# Patient Record
Sex: MALE | Race: WHITE | NOT HISPANIC OR LATINO | URBAN - METROPOLITAN AREA
[De-identification: names, ages, dates, MRNs, and addresses within clinical notes are randomized per-mention and may not be internally consistent; named-entity substitution may affect disease eponyms.]

---

## 2017-01-19 ENCOUNTER — OFFICE (OUTPATIENT)
Dept: URBAN - METROPOLITAN AREA CLINIC 11 | Facility: CLINIC | Age: 67
End: 2017-01-19

## 2017-01-19 VITALS
RESPIRATION RATE: 16 BRPM | SYSTOLIC BLOOD PRESSURE: 141 MMHG | WEIGHT: 241 LBS | HEIGHT: 71 IN | HEART RATE: 90 BPM | DIASTOLIC BLOOD PRESSURE: 101 MMHG

## 2017-01-19 DIAGNOSIS — K59.03 DRUG INDUCED CONSTIPATION: ICD-10-CM

## 2017-01-19 PROCEDURE — 99213 OFFICE O/P EST LOW 20 MIN: CPT | Performed by: INTERNAL MEDICINE

## 2017-01-19 RX ORDER — NALOXEGOL OXALATE 25 MG/1
TABLET, FILM COATED ORAL
Qty: 30 | Refills: 6 | Status: COMPLETED
Start: 2016-04-26 | End: 2017-01-19

## 2017-01-19 RX ORDER — LUBIPROSTONE 8 UG/1
CAPSULE, GELATIN COATED ORAL
Qty: 60 | Refills: 0 | Status: COMPLETED
Start: 2017-01-19 | End: 2017-03-30

## 2017-01-19 RX ORDER — POLYETHYLENE GLYCOL 3350 17 G/17G
POWDER, FOR SOLUTION ORAL
Qty: 1 | Refills: 11 | Status: COMPLETED
Start: 2016-07-01 | End: 2017-01-19

## 2017-01-19 NOTE — SERVICEHPINOTES
He has continued to ahve issues with his stooling.  He has had hard stools that can be difficult to pass followed by 3-4 days without a movement.  He has been on the Miralax once day and has been "afraid to use more" due to possible diarrhea.   He is also on the Movantik daily.  He has continued to need his short and longterm Morphine.  He has not been on Amitiza.

## 2017-01-19 NOTE — SERVICENOTES
We discussed the constipation and the medication changes.  If this dosing of Amitiza is not helpful, I would increase it after about 2 weeks.  He is to call and check in.  He can use the MIralax as needed as well.

## 2017-03-30 ENCOUNTER — OFFICE (OUTPATIENT)
Dept: URBAN - METROPOLITAN AREA CLINIC 11 | Facility: CLINIC | Age: 67
End: 2017-03-30

## 2017-03-30 VITALS
HEIGHT: 71 IN | HEART RATE: 84 BPM | WEIGHT: 245 LBS | RESPIRATION RATE: 16 BRPM | SYSTOLIC BLOOD PRESSURE: 122 MMHG | DIASTOLIC BLOOD PRESSURE: 75 MMHG

## 2017-03-30 DIAGNOSIS — K59.00 CONSTIPATION, UNSPECIFIED: ICD-10-CM

## 2017-03-30 PROCEDURE — 99213 OFFICE O/P EST LOW 20 MIN: CPT | Performed by: INTERNAL MEDICINE

## 2017-03-30 RX ORDER — LUBIPROSTONE 8 UG/1
CAPSULE, GELATIN COATED ORAL
Qty: 60 | Refills: 0 | Status: COMPLETED
Start: 2017-01-19 | End: 2017-03-30

## 2017-03-30 RX ORDER — PLECANATIDE 3 MG/1
3 TABLET ORAL
Qty: 90 | Refills: 1 | Status: COMPLETED
Start: 2017-03-30 | End: 2017-10-27

## 2017-03-30 NOTE — SERVICENOTES
We discussed his medication changes, diet issues, expectations, and risks of pelvic floor dysfunction.

## 2017-03-30 NOTE — SERVICEHPINOTES
The constipation has continued to be an issue.  He may have a stool every several days.  he yamile have 1-2 days of 3-4 stools and then stop again.  He has pain just before he starts passing the stools. There is "a lot" of gas prior to passing the stools.  The stools are without bleeding and the stools are a normal consistency (sometimes the first stool is harder).  He has not had other associated GI issues. He has been on the Amitiza without improvement.  He has been on Miralax but with daily dosing he had diarrhea. He has also tried Movantik and Linzess. He has been on his pain meds consistently.

## 2017-06-30 ENCOUNTER — OFFICE (OUTPATIENT)
Dept: URBAN - METROPOLITAN AREA CLINIC 11 | Facility: CLINIC | Age: 67
End: 2017-06-30

## 2017-06-30 VITALS
SYSTOLIC BLOOD PRESSURE: 132 MMHG | HEIGHT: 71 IN | DIASTOLIC BLOOD PRESSURE: 91 MMHG | WEIGHT: 225 LBS | HEART RATE: 67 BPM

## 2017-06-30 DIAGNOSIS — K59.00 CONSTIPATION, UNSPECIFIED: ICD-10-CM

## 2017-06-30 DIAGNOSIS — Z86.010 PERSONAL HISTORY OF COLONIC POLYPS: ICD-10-CM

## 2017-06-30 PROCEDURE — 99213 OFFICE O/P EST LOW 20 MIN: CPT | Performed by: INTERNAL MEDICINE

## 2017-06-30 RX ORDER — POLYETHYLENE GLYCOL 3350 17 G/17G
578 POWDER, FOR SOLUTION ORAL
Qty: 1 | Refills: 3 | Status: COMPLETED
Start: 2017-06-30 | End: 2018-08-31

## 2017-06-30 RX ORDER — POLYETHYLENE GLYCOL 3350, SODIUM SULFATE, SODIUM CHLORIDE, POTASSIUM CHLORIDE, ASCORBIC ACID, SODIUM ASCORBATE 7.5-2.691G
KIT ORAL
Qty: 1 | Refills: 0 | Status: COMPLETED
Start: 2017-06-30 | End: 2018-01-09

## 2017-06-30 NOTE — SERVICENOTES
We discussed his constipation, multiple medications, history of polyps, and the addition of Miralax to the Trulance.  With the history of polyps, he is due for his colonoscopy and we discussed the need to ensure that he did not have new polyps, tumors or strictures with his prior surgeries.

## 2017-06-30 NOTE — SERVICEHPINOTES
He has been having difficulties with his headaches and has been using Sumatriptan for them.He has had continued issues with constipation.  He has been having stools for a couple of a days that are mostly normal and then may skip a week or 10 days without another stool.  The first stool afterward is solid and then he may have a diarrhea stool.  He has noted some leakage with the constipation.   He has been taking Trulance without improvement. He has not been using OTC meds. He has been on Miralax, Linzess, Movantik, and Amitiza. He has a history of tubular adenomas and his last colonoscopy was in 2012.   FONT style="BACKGROUND-COLOR: #ffffcc" visited="true"BR/FONT  H

## 2017-07-24 ENCOUNTER — AMBULATORY SURGICAL CENTER (OUTPATIENT)
Dept: URBAN - METROPOLITAN AREA SURGERY 3 | Facility: SURGERY | Age: 67
End: 2017-07-24

## 2017-07-24 ENCOUNTER — OFFICE (OUTPATIENT)
Dept: URBAN - METROPOLITAN AREA CLINIC 11 | Facility: CLINIC | Age: 67
End: 2017-07-24

## 2017-07-24 VITALS
HEART RATE: 87 BPM | OXYGEN SATURATION: 96 % | SYSTOLIC BLOOD PRESSURE: 136 MMHG | DIASTOLIC BLOOD PRESSURE: 84 MMHG | SYSTOLIC BLOOD PRESSURE: 132 MMHG | SYSTOLIC BLOOD PRESSURE: 130 MMHG | HEART RATE: 86 BPM | HEART RATE: 91 BPM | DIASTOLIC BLOOD PRESSURE: 88 MMHG | RESPIRATION RATE: 18 BRPM | HEART RATE: 88 BPM | DIASTOLIC BLOOD PRESSURE: 84 MMHG | OXYGEN SATURATION: 96 % | RESPIRATION RATE: 19 BRPM | DIASTOLIC BLOOD PRESSURE: 83 MMHG | TEMPERATURE: 97.4 F | TEMPERATURE: 97.4 F | DIASTOLIC BLOOD PRESSURE: 81 MMHG | DIASTOLIC BLOOD PRESSURE: 94 MMHG | TEMPERATURE: 97 F | SYSTOLIC BLOOD PRESSURE: 126 MMHG | DIASTOLIC BLOOD PRESSURE: 81 MMHG | RESPIRATION RATE: 18 BRPM | DIASTOLIC BLOOD PRESSURE: 83 MMHG | DIASTOLIC BLOOD PRESSURE: 88 MMHG | HEART RATE: 87 BPM | HEART RATE: 91 BPM | HEART RATE: 92 BPM | HEART RATE: 86 BPM | SYSTOLIC BLOOD PRESSURE: 151 MMHG | HEART RATE: 88 BPM | HEART RATE: 92 BPM | SYSTOLIC BLOOD PRESSURE: 136 MMHG | OXYGEN SATURATION: 95 % | TEMPERATURE: 97 F | SYSTOLIC BLOOD PRESSURE: 132 MMHG | SYSTOLIC BLOOD PRESSURE: 151 MMHG | OXYGEN SATURATION: 95 % | RESPIRATION RATE: 14 BRPM | SYSTOLIC BLOOD PRESSURE: 130 MMHG | HEIGHT: 71 IN | RESPIRATION RATE: 14 BRPM | RESPIRATION RATE: 19 BRPM | HEIGHT: 71 IN | DIASTOLIC BLOOD PRESSURE: 94 MMHG | SYSTOLIC BLOOD PRESSURE: 126 MMHG

## 2017-07-24 DIAGNOSIS — Z86.010 PERSONAL HISTORY OF COLONIC POLYPS: ICD-10-CM

## 2017-07-24 DIAGNOSIS — D12.3 BENIGN NEOPLASM OF TRANSVERSE COLON: ICD-10-CM

## 2017-07-24 PROCEDURE — 45380 COLONOSCOPY AND BIOPSY: CPT | Mod: 33 | Performed by: INTERNAL MEDICINE

## 2017-07-24 PROCEDURE — 88305 TISSUE EXAM BY PATHOLOGIST: CPT | Performed by: INTERNAL MEDICINE

## 2017-10-27 ENCOUNTER — OFFICE (OUTPATIENT)
Dept: URBAN - METROPOLITAN AREA CLINIC 11 | Facility: CLINIC | Age: 67
End: 2017-10-27

## 2017-10-27 VITALS
HEIGHT: 71 IN | HEART RATE: 74 BPM | WEIGHT: 228 LBS | DIASTOLIC BLOOD PRESSURE: 88 MMHG | SYSTOLIC BLOOD PRESSURE: 132 MMHG

## 2017-10-27 DIAGNOSIS — T40.2X5D ADVERSE EFFECT OF OTHER OPIOIDS, SUBSEQUENT ENCOUNTER: ICD-10-CM

## 2017-10-27 PROCEDURE — 99213 OFFICE O/P EST LOW 20 MIN: CPT | Performed by: INTERNAL MEDICINE

## 2017-10-27 NOTE — SERVICEHPINOTES
He has been taking Miralax and Relistor (started yesterday) by this pain management provider.  He had been on Miralax and Trulance without improvement.  He has a stool about once a week.  At times the stools can be hard and large but sometimes soft and looser.  He has been on Movantik this year and had been on Linzess, Miralax, Trulance, and Amitiza.  He was on Relistor previously but only had a couple of injection. He had a small polyp noted on colonoscopy.  He has been trying to walk the dog a couple of times a day and attempts at being active around the house.  He ahs continued on the narcotics for pain control. He has not been having issues with the reflux on meds.  He has had some intermittent "choking" with saliva which seems to be related to is underlying tremors.

## 2017-10-27 NOTE — SERVICENOTES
He has had his meds changed from Trulance/Miralax to Miralax/relistor yesterday.  I would see how this works prior changing the Relistor.  This is a complex issue with the dysautonomia and also drug related difficulties

## 2018-01-09 ENCOUNTER — OFFICE (OUTPATIENT)
Dept: URBAN - METROPOLITAN AREA CLINIC 11 | Facility: CLINIC | Age: 68
End: 2018-01-09
Payer: MEDICARE

## 2018-01-09 VITALS
SYSTOLIC BLOOD PRESSURE: 147 MMHG | WEIGHT: 216 LBS | DIASTOLIC BLOOD PRESSURE: 98 MMHG | HEIGHT: 71 IN | HEART RATE: 73 BPM

## 2018-01-09 DIAGNOSIS — T40.2X5D ADVERSE EFFECT OF OTHER OPIOIDS, SUBSEQUENT ENCOUNTER: ICD-10-CM

## 2018-01-09 PROCEDURE — 99213 OFFICE O/P EST LOW 20 MIN: CPT | Performed by: INTERNAL MEDICINE

## 2018-01-09 NOTE — SERVICENOTES
With his severe constipation and noting the several regmines/medications that have not worked, I would like to retry Amitzia but at 24mcg BID with the daily Relistor.  If this is not helpful, I would like a Sitz marker study.  We did discuss a potential colectomy if this does not improve.  He was given samples of the Amitiza and a script can be sent later if this works.

## 2018-01-09 NOTE — SERVICEHPINOTES
He presents for f/u of his constipation.  Despite taking his meds, he had not had any stool for about 3 weeks until after taking mag citrate yesterday.  He has been consistent with his Miralax and Relistor. He has been on several other meds including using an enema.  He has been on Movantik without improvement.  He has not had blood in stool and has not had acute abdominal pain.  He has not had nuasea iwh the constiaptoin. .  His narcotics have not changes.  He has also had issues with urinary tract infections and difficulty urinating.He had been on mestinon about 2 weeks ago. His last colonoscopy was in July.  He has a heartcath scheduled for Friday.

## 2018-03-01 ENCOUNTER — OFFICE (OUTPATIENT)
Dept: URBAN - METROPOLITAN AREA CLINIC 11 | Facility: CLINIC | Age: 68
End: 2018-03-01
Payer: MEDICARE

## 2018-03-01 VITALS
HEIGHT: 71 IN | HEART RATE: 81 BPM | DIASTOLIC BLOOD PRESSURE: 91 MMHG | SYSTOLIC BLOOD PRESSURE: 139 MMHG | WEIGHT: 215 LBS

## 2018-03-01 DIAGNOSIS — K59.00 CONSTIPATION, UNSPECIFIED: ICD-10-CM

## 2018-03-01 PROCEDURE — 99213 OFFICE O/P EST LOW 20 MIN: CPT | Performed by: INTERNAL MEDICINE

## 2018-03-01 RX ORDER — SENNA AND DOCUSATE SODIUM 50; 8.6 MG/1; MG/1
TABLET, FILM COATED ORAL
Qty: 120 | Refills: 11 | Status: COMPLETED
Start: 2018-03-01 | End: 2018-11-08

## 2018-03-01 NOTE — SERVICEHPINOTES
He has had some improvement in his stool and was having a stool about once a week (instead of weeks).  He has been on the Senna and Amitiza.  Recently he was out of the Senna and went a week and a half.  He has not had acute abdominal paion or toehr symptoms. He has not had issues with the meds.  He has been on Relistor once daily, Amitiza 24mcg BID, and Senna with prn Miralax. He had a his f/u cath and no significant plaques were noted.  BR

## 2018-03-01 NOTE — SERVICENOTES
We discused the extremes of his constipation, the combination of his meds, and the increase in the senna.  We discussed colonic atony and a possible future colecotomy or megacolon.

## 2018-05-01 ENCOUNTER — OFFICE (OUTPATIENT)
Dept: URBAN - METROPOLITAN AREA CLINIC 11 | Facility: CLINIC | Age: 68
End: 2018-05-01
Payer: COMMERCIAL

## 2018-05-01 VITALS
HEIGHT: 71 IN | WEIGHT: 211 LBS | HEART RATE: 81 BPM | SYSTOLIC BLOOD PRESSURE: 113 MMHG | DIASTOLIC BLOOD PRESSURE: 66 MMHG

## 2018-05-01 DIAGNOSIS — K21.9 GASTRO-ESOPHAGEAL REFLUX DISEASE WITHOUT ESOPHAGITIS: ICD-10-CM

## 2018-05-01 DIAGNOSIS — T50.905A ADVERSE EFFECT OF UNSPECIFIED DRUGS, MEDICAMENTS AND BIOLOGI: ICD-10-CM

## 2018-05-01 PROCEDURE — 99213 OFFICE O/P EST LOW 20 MIN: CPT | Performed by: INTERNAL MEDICINE

## 2018-05-01 RX ORDER — METHYLNALTREXONE BROMIDE 150 MG/1
TABLET ORAL
Qty: 90 | Refills: 11 | Status: COMPLETED
End: 2019-05-23

## 2018-05-01 RX ORDER — PANTOPRAZOLE SODIUM 40 MG/1
40 TABLET, DELAYED RELEASE ORAL
Qty: 90 | Refills: 3 | Status: ACTIVE

## 2018-05-01 NOTE — SERVICENOTES
He has had an improvement but has required a fairly involved regimine.  We discussed a possible change from Relistor to symphoric but with current improvement, he wanted to wait on the addition/changes to his meds.

## 2018-05-01 NOTE — SERVICEHPINOTES
He stated that he has been havign stools about twice a week which is an improvement from about once a month.  He stated that the stools are soft and are requiring Relistor, Amitiza, Senna and prn Miralax to have the pattern.  He has not had had issues with bleeding or recurrent abdominal pain.  He may have some discomfort with gas prior to stools.  He has noted some continued feeling of inomplete clearing after a stool, but it is not consistent.  He stated that on the days he has stools that he may have 2-3 stools that day with a fairly large amount of stool.He has been on Protonix and has not had issues with his GERD while on it.  He has continued on the Morphine.

## 2018-08-31 ENCOUNTER — OFFICE (OUTPATIENT)
Dept: URBAN - METROPOLITAN AREA CLINIC 11 | Facility: CLINIC | Age: 68
End: 2018-08-31
Payer: COMMERCIAL

## 2018-08-31 VITALS
DIASTOLIC BLOOD PRESSURE: 83 MMHG | SYSTOLIC BLOOD PRESSURE: 125 MMHG | WEIGHT: 199 LBS | HEIGHT: 71 IN | HEART RATE: 78 BPM

## 2018-08-31 DIAGNOSIS — T50.905D ADVERSE EFFECT OF UNSPECIFIED DRUGS, MEDICAMENTS AND BIOLOGI: ICD-10-CM

## 2018-08-31 PROCEDURE — 99213 OFFICE O/P EST LOW 20 MIN: CPT | Performed by: INTERNAL MEDICINE

## 2018-08-31 RX ORDER — LINACLOTIDE 145 UG/1
145 CAPSULE, GELATIN COATED ORAL
Qty: 90 | Refills: 3 | Status: COMPLETED
Start: 2018-08-31 | End: 2018-10-14

## 2018-08-31 RX ORDER — NALDEMEDINE 0.2 MG/1
0.2 TABLET ORAL
Refills: 0 | Status: COMPLETED
End: 2018-08-31

## 2018-08-31 NOTE — SERVICEHPINOTES
He presents for f/u of his constipation.  This week has been doing beter.  He has been on Relistor and has constipation without a stool for 1-2 weeks and then will have stools for a few days. This week he has had stools daily.  He has been on several meds.  He stated that he thought he was taking Symproic currently but it not in his list and not in his historical list.  He is on relistor daily as well.   He has been on Miralax with his relistor in the past and had dairrhea with it.  He only takes it on rare occasion.  He is off of the senna as well.

## 2018-08-31 NOTE — SERVICENOTES
We again discussed his meds and I would stop the symproic and add Linzess.  We discussed the potential side effect issues with the meds.

## 2018-11-08 ENCOUNTER — OFFICE (OUTPATIENT)
Dept: URBAN - METROPOLITAN AREA CLINIC 11 | Facility: CLINIC | Age: 68
End: 2018-11-08
Payer: COMMERCIAL

## 2018-11-08 VITALS
WEIGHT: 200 LBS | HEIGHT: 71 IN | SYSTOLIC BLOOD PRESSURE: 118 MMHG | HEART RATE: 76 BPM | DIASTOLIC BLOOD PRESSURE: 80 MMHG

## 2018-11-08 DIAGNOSIS — T40.2X5D ADVERSE EFFECT OF OTHER OPIOIDS, SUBSEQUENT ENCOUNTER: ICD-10-CM

## 2018-11-08 PROCEDURE — 99213 OFFICE O/P EST LOW 20 MIN: CPT | Performed by: INTERNAL MEDICINE

## 2018-11-08 RX ORDER — NALDEMEDINE 0.2 MG/1
0.2 TABLET ORAL
Qty: 90 | Refills: 3 | Status: COMPLETED
Start: 2018-11-08 | End: 2019-08-29

## 2018-11-08 NOTE — SERVICEHPINOTES
He presents for f/u of his constipation.  While on the Linzess 145mcg he was having frequent urgent diarrhea.  He was off of the Miralax with the Linzess.  He did not have stool after stopping the Linzess for about 2 weeks.  He has restarted the Miralax and had a stool today.  He is now taking Relistor, Miralax and has restarted the Symproic. His narcotics have been stable.  While he was constipated he was having gas issues.  He has not wanted to try a lower dose of the Linzess yet.

## 2018-11-08 NOTE — SERVICENOTES
We discussed his constipation and the use of his meds.  If this does not continue to work, I would like to retry Linzess at a lower dose.  The Protonix has worked well for his reflux issues.

## 2019-02-08 ENCOUNTER — OFFICE (OUTPATIENT)
Dept: URBAN - METROPOLITAN AREA CLINIC 11 | Facility: CLINIC | Age: 69
End: 2019-02-08

## 2019-02-08 VITALS
HEART RATE: 62 BPM | DIASTOLIC BLOOD PRESSURE: 86 MMHG | HEIGHT: 71 IN | SYSTOLIC BLOOD PRESSURE: 138 MMHG | WEIGHT: 189 LBS

## 2019-02-08 DIAGNOSIS — K59.4 ANAL SPASM: ICD-10-CM

## 2019-02-08 DIAGNOSIS — T50.905D ADVERSE EFFECT OF UNSPECIFIED DRUGS, MEDICAMENTS AND BIOLOGI: ICD-10-CM

## 2019-02-08 DIAGNOSIS — E66.3 OVERWEIGHT: ICD-10-CM

## 2019-02-08 PROCEDURE — 99212 OFFICE O/P EST SF 10 MIN: CPT | Performed by: INTERNAL MEDICINE

## 2019-02-08 RX ORDER — HYDROCORTISONE ACETATE PRAMOXINE HCL 2.5; 1 G/100G; G/100G
CREAM TOPICAL
Qty: 10 | Refills: 3 | Status: COMPLETED
Start: 2019-02-08 | End: 2019-05-23

## 2019-02-08 NOTE — SERVICENOTES
We discussed the proctaglia, prn meds, and bowel patterns.  As to the constipation, he is on quite a complex regimine inorder pass stools which is complicated with his narcotics. This has been one of the better regmines todate.  We discussed the use of the levsin for the proctalgia and the use of the Pramoine/steroids topically.

## 2019-02-08 NOTE — SERVICEHPINOTES
He stated that he has been avhign the proctalgia fugax this week.  He has had 5 episodes of the rectal spasm.  It is better with the levsin, massage, and sitting on the toilet. He stated that this was similar to prior issues but he had not had difficulties in several months.  He has not noted an inciting event.  The pain may last 10-30 minutes. He has not had a change in his bowel pattern, bleeding or such. His constiaption has been unchanged.  His stools have been formed. He has been drinking more coffee which he thought has really helped.  He has been taking Relistor in the mornings, Symproic at night, and Amitiza BID to have a stool about once a week to 10 days.  He has had johnnie breakthrough in his reflux, which had been well controlled with his meds.  He has not had a recent diet or med change.  He has had control with additional Tums.

## 2019-05-23 ENCOUNTER — OFFICE (OUTPATIENT)
Dept: URBAN - METROPOLITAN AREA CLINIC 11 | Facility: CLINIC | Age: 69
End: 2019-05-23

## 2019-05-23 VITALS
WEIGHT: 197 LBS | DIASTOLIC BLOOD PRESSURE: 74 MMHG | HEART RATE: 93 BPM | SYSTOLIC BLOOD PRESSURE: 135 MMHG | HEIGHT: 71 IN

## 2019-05-23 DIAGNOSIS — T50.905D ADVERSE EFFECT OF UNSPECIFIED DRUGS, MEDICAMENTS AND BIOLOGI: ICD-10-CM

## 2019-05-23 DIAGNOSIS — K59.00 CONSTIPATION, UNSPECIFIED: ICD-10-CM

## 2019-05-23 PROCEDURE — 99213 OFFICE O/P EST LOW 20 MIN: CPT | Performed by: INTERNAL MEDICINE

## 2019-05-23 RX ORDER — LUBIPROSTONE 24 UG/1
24 CAPSULE, GELATIN COATED ORAL
Qty: 90 | Refills: 3 | Status: COMPLETED
Start: 2018-01-29 | End: 2019-05-23

## 2019-05-23 RX ORDER — METHYLNALTREXONE BROMIDE 150 MG/1
TABLET ORAL
Qty: 90 | Refills: 11 | Status: COMPLETED
End: 2019-05-23

## 2019-05-23 RX ORDER — PRUCALOPRIDE 2 MG/1
2 TABLET, FILM COATED ORAL
Qty: 90 | Refills: 3 | Status: ACTIVE

## 2019-05-23 NOTE — SERVICEHPINOTES
He presents for f/u of his constipatoin related to autonomic issues and medications.  He has continued to have issues with skips of several days followed by formed stools and then diarrhea.  He may skip 1-2 weeks without a stool.  He has been using Miralax, Relistor, Symproic and Amitiza in some combination.

## 2019-08-29 ENCOUNTER — OFFICE (OUTPATIENT)
Dept: URBAN - METROPOLITAN AREA CLINIC 11 | Facility: CLINIC | Age: 69
End: 2019-08-29

## 2019-08-29 VITALS
HEIGHT: 71 IN | SYSTOLIC BLOOD PRESSURE: 150 MMHG | DIASTOLIC BLOOD PRESSURE: 95 MMHG | WEIGHT: 184 LBS | HEART RATE: 75 BPM

## 2019-08-29 DIAGNOSIS — K59.4 ANAL SPASM: ICD-10-CM

## 2019-08-29 DIAGNOSIS — K59.00 CONSTIPATION, UNSPECIFIED: ICD-10-CM

## 2019-08-29 PROCEDURE — 99213 OFFICE O/P EST LOW 20 MIN: CPT | Performed by: INTERNAL MEDICINE

## 2019-08-29 NOTE — SERVICEHPINOTES
He presents for f/u of his constipation.  He has been on Motegrity and has thought that it has really helped.  He has changed from a stool episode every 2 weeks or more to a pattern of twice weekly. He is now off of the other constipation.  He has not had isues with gas/bloating difficulties.  He has not had blood in his stools.  He has had three episodes of rectal spasms in two weeks.  They vary in length of time for quite brief to a few hours.  He has taken Levsin and sits on the toilet which helps.

## 2019-08-29 NOTE — SERVICENOTES
His constipation has improved but he has had proctalgia again, which had been an issue in the past.  I would continue the Levsin and would have him try Rectiv for the more prolonged spasms/proctalgia fugax but he is on cialis.  We can try diltiazem cream.

## 2019-12-19 ENCOUNTER — OFFICE (OUTPATIENT)
Dept: URBAN - METROPOLITAN AREA CLINIC 11 | Facility: CLINIC | Age: 69
End: 2019-12-19

## 2019-12-19 VITALS
HEART RATE: 78 BPM | HEIGHT: 71 IN | WEIGHT: 180 LBS | SYSTOLIC BLOOD PRESSURE: 135 MMHG | DIASTOLIC BLOOD PRESSURE: 88 MMHG

## 2019-12-19 DIAGNOSIS — K59.00 CONSTIPATION, UNSPECIFIED: ICD-10-CM

## 2019-12-19 PROCEDURE — 99213 OFFICE O/P EST LOW 20 MIN: CPT | Performed by: INTERNAL MEDICINE

## 2019-12-19 RX ORDER — PRUCALOPRIDE 2 MG/1
2 TABLET, FILM COATED ORAL
Qty: 90 | Refills: 3 | Status: ACTIVE

## 2019-12-19 NOTE — SERVICEHPINOTES
He presents for f/u of his constipation.  He has been on Motegrity and has thought it has really helped.  He has kathe having more normal stools.  He has been having stools more than once a week, about every 3 days or so.  This is an improvement overall.  He may have stools for a couple of days in a row and then cycle. For about four weeks he had  n/v/d on Fridays that would last for a couple of days and resolve.  It has since resolved.  He was not certain what caused the issue.  He had not had noted exposures or ill prepped foods.

## 2019-12-19 NOTE — SERVICENOTES
He has improved on the Motegrity and has been feeling better than he has in a while.  We dsicussed his recent issue with n/v/d on Fridays which has passed.

## 2020-09-25 ENCOUNTER — OFFICE (OUTPATIENT)
Dept: URBAN - METROPOLITAN AREA CLINIC 11 | Facility: CLINIC | Age: 70
End: 2020-09-25

## 2020-09-25 VITALS
DIASTOLIC BLOOD PRESSURE: 61 MMHG | SYSTOLIC BLOOD PRESSURE: 102 MMHG | OXYGEN SATURATION: 98 % | HEART RATE: 72 BPM | WEIGHT: 182 LBS | HEIGHT: 71 IN

## 2020-09-25 DIAGNOSIS — R11.10 VOMITING, UNSPECIFIED: ICD-10-CM

## 2020-09-25 DIAGNOSIS — R19.7 DIARRHEA, UNSPECIFIED: ICD-10-CM

## 2020-09-25 PROCEDURE — 99214 OFFICE O/P EST MOD 30 MIN: CPT | Performed by: NURSE PRACTITIONER

## 2020-09-25 NOTE — SERVICEHPINOTES
Mr. Maier presents today for vomiting and diarrhea. He notes that this has occurred for a couple of days 3-4 different times over the past 4 weeks. He notes Monday through Wednesday he has had vomiting and diarrhea. He notes he was constantly going to the bathroom with diarrhea. He has not had any bowel movements since then. He notes stool is yellowish and loose to liquid. He denies noticing oil or mucus in stool. He denies hematochezia and melena. Associated symptoms include umbilical to lower abdominal pain. He denies fever.The episode before this he was having much more vomiting than diarrhea. He notes he was vomiting up mostly bile. He notes pain at that time was higher in epigastric and esophageal area.

## 2020-09-25 NOTE — SERVICENOTES
Pt was seen and evalauated with Phyllis ENRIQUEZ.  I agree with the above evalaution and plan as we discussed.  He has the hx of dysautonomia and variable GI issues.  He had improved for a while but recently he started having increased diarrhea and recurrent n/v. He thought that this was different that his prior cycles of n/v/d alternating with constipation.  He was more concered about the amount of "yellowish liquid" stools.  He did have wretching with the n/v which caused epigastric pain.  He stated that when this started he had a COVID test that was negative.  He has improved again over the past 48 hours or so. This episode has lasted a few days where in the past it would occur for about a day followed by the constipation (he is on narcotics).  I would do f/u labs including studies for porphyria as well as the EGD and FSC with bx.

## 2020-09-28 ENCOUNTER — OFFICE (OUTPATIENT)
Dept: URBAN - METROPOLITAN AREA CLINIC 11 | Facility: CLINIC | Age: 70
End: 2020-09-28

## 2020-09-28 LAB
C1 ESTERASE INHIBITOR, FUNC: C1 EST.INHIB.FUNCT.: >100 %MEAN NORMAL
CBC WITH DIFFERENTIAL/PLATELET: BASO (ABSOLUTE): 0.1 X10E3/UL (ref 0–0.2)
CBC WITH DIFFERENTIAL/PLATELET: BASOS: 1 %
CBC WITH DIFFERENTIAL/PLATELET: EOS (ABSOLUTE): 0.1 X10E3/UL (ref 0–0.4)
CBC WITH DIFFERENTIAL/PLATELET: EOS: 2 %
CBC WITH DIFFERENTIAL/PLATELET: HEMATOCRIT: 40.7 % (ref 37.5–51)
CBC WITH DIFFERENTIAL/PLATELET: HEMOGLOBIN: 13.4 G/DL (ref 13–17.7)
CBC WITH DIFFERENTIAL/PLATELET: IMMATURE GRANS (ABS): 0 X10E3/UL (ref 0–0.1)
CBC WITH DIFFERENTIAL/PLATELET: IMMATURE GRANULOCYTES: 0 %
CBC WITH DIFFERENTIAL/PLATELET: LYMPHS (ABSOLUTE): 2.1 X10E3/UL (ref 0.7–3.1)
CBC WITH DIFFERENTIAL/PLATELET: LYMPHS: 35 %
CBC WITH DIFFERENTIAL/PLATELET: MCH: 33.5 PG — HIGH (ref 26.6–33)
CBC WITH DIFFERENTIAL/PLATELET: MCHC: 32.9 G/DL (ref 31.5–35.7)
CBC WITH DIFFERENTIAL/PLATELET: MCV: 102 FL — HIGH (ref 79–97)
CBC WITH DIFFERENTIAL/PLATELET: MONOCYTES(ABSOLUTE): 0.5 X10E3/UL (ref 0.1–0.9)
CBC WITH DIFFERENTIAL/PLATELET: MONOCYTES: 9 %
CBC WITH DIFFERENTIAL/PLATELET: NEUTROPHILS (ABSOLUTE): 3.1 X10E3/UL (ref 1.4–7)
CBC WITH DIFFERENTIAL/PLATELET: NEUTROPHILS: 53 %
CBC WITH DIFFERENTIAL/PLATELET: PLATELETS: 219 X10E3/UL (ref 150–450)
CBC WITH DIFFERENTIAL/PLATELET: RBC: 4 X10E6/UL — LOW (ref 4.14–5.8)
CBC WITH DIFFERENTIAL/PLATELET: RDW: 12.6 % (ref 11.6–15.4)
CBC WITH DIFFERENTIAL/PLATELET: WBC: 5.9 X10E3/UL (ref 3.4–10.8)
COMP. METABOLIC PANEL (14): A/G RATIO: 2.2 (ref 1.2–2.2)
COMP. METABOLIC PANEL (14): ALBUMIN: 4.2 G/DL (ref 3.7–4.7)
COMP. METABOLIC PANEL (14): ALKALINE PHOSPHATASE: 77 IU/L (ref 39–117)
COMP. METABOLIC PANEL (14): ALT (SGPT): 19 IU/L (ref 0–44)
COMP. METABOLIC PANEL (14): AST (SGOT): 21 IU/L (ref 0–40)
COMP. METABOLIC PANEL (14): BILIRUBIN, TOTAL: 0.4 MG/DL (ref 0–1.2)
COMP. METABOLIC PANEL (14): BUN/CREATININE RATIO: 14 (ref 10–24)
COMP. METABOLIC PANEL (14): BUN: 14 MG/DL (ref 8–27)
COMP. METABOLIC PANEL (14): CALCIUM: 9.1 MG/DL (ref 8.6–10.2)
COMP. METABOLIC PANEL (14): CARBON DIOXIDE, TOTAL: 26 MMOL/L (ref 20–29)
COMP. METABOLIC PANEL (14): CHLORIDE: 102 MMOL/L (ref 96–106)
COMP. METABOLIC PANEL (14): CREATININE: 1.02 MG/DL (ref 0.76–1.27)
COMP. METABOLIC PANEL (14): EGFR IF AFRICN AM: 85 ML/MIN/1.73 (ref 59–?)
COMP. METABOLIC PANEL (14): EGFR IF NONAFRICN AM: 74 ML/MIN/1.73 (ref 59–?)
COMP. METABOLIC PANEL (14): GLOBULIN, TOTAL: 1.9 G/DL (ref 1.5–4.5)
COMP. METABOLIC PANEL (14): GLUCOSE: 102 MG/DL — HIGH (ref 65–99)
COMP. METABOLIC PANEL (14): POTASSIUM: 4.9 MMOL/L (ref 3.5–5.2)
COMP. METABOLIC PANEL (14): PROTEIN, TOTAL: 6.1 G/DL (ref 6–8.5)
COMP. METABOLIC PANEL (14): SODIUM: 140 MMOL/L (ref 134–144)
PORPHOBILINOGEN, QN, 24-HR UR: PORPHOBILINOGEN, 24U: 0.9 MG/24 HR (ref 0–1.5)
PORPHOBILINOGEN, QN, 24-HR UR: PORPHOBILINOGEN,QN,U: 0.2 MG/L (ref 0–2)
PORPHYRINS, QN, 24 HR UR.: COPROPOR(CP)III,24HR: 14 UG/24 HR (ref 0–74)
PORPHYRINS, QN, 24 HR UR.: COPROPORPH(CP)I,24HR: 45 UG/24 HR — HIGH (ref 0–24)
PORPHYRINS, QN, 24 HR UR.: COPROPORPHYRIN (CP) I: 10 UG/L
PORPHYRINS, QN, 24 HR UR.: COPROPORPHYRIN (CP) III: 3 UG/L
PORPHYRINS, QN, 24 HR UR.: HEPTACARB(7-CP),24HR: 9 UG/24 HR — HIGH (ref 0–4)
PORPHYRINS, QN, 24 HR UR.: HEPTACARBOXYL (7-CP): 2 UG/L
PORPHYRINS, QN, 24 HR UR.: HEXACARB(6-CP),24HR: <5 UG/24 HR — HIGH
PORPHYRINS, QN, 24 HR UR.: HEXACARBOXYL (6-CP): <1 UG/L
PORPHYRINS, QN, 24 HR UR.: PENTACARB(5-CP),24HR: <5 UG/24 HR
PORPHYRINS, QN, 24 HR UR.: PENTACARBOXYL (5-CP): <1 UG/L
PORPHYRINS, QN, 24 HR UR.: UROPORPH(UP),24HR: 27 UG/24 HR — HIGH (ref 0–24)
PORPHYRINS, QN, 24 HR UR.: UROPORPHYRINS (UP): 6 UG/L
PORPHYRINS, TOTAL SERUM: S PORPHYRINS: <0.1 UG/DL

## 2020-10-01 ENCOUNTER — AMBULATORY SURGICAL CENTER (OUTPATIENT)
Dept: URBAN - METROPOLITAN AREA SURGERY 3 | Facility: SURGERY | Age: 70
End: 2020-10-01

## 2020-10-01 ENCOUNTER — OFFICE (OUTPATIENT)
Dept: URBAN - METROPOLITAN AREA PATHOLOGY 22 | Facility: PATHOLOGY | Age: 70
End: 2020-10-01
Payer: COMMERCIAL

## 2020-10-01 VITALS
OXYGEN SATURATION: 96 % | SYSTOLIC BLOOD PRESSURE: 138 MMHG | HEART RATE: 70 BPM | HEART RATE: 70 BPM | DIASTOLIC BLOOD PRESSURE: 91 MMHG | WEIGHT: 182 LBS | HEART RATE: 71 BPM | SYSTOLIC BLOOD PRESSURE: 122 MMHG | RESPIRATION RATE: 19 BRPM | TEMPERATURE: 96.9 F | HEART RATE: 68 BPM | RESPIRATION RATE: 22 BRPM | OXYGEN SATURATION: 96 % | HEIGHT: 71 IN | DIASTOLIC BLOOD PRESSURE: 91 MMHG | SYSTOLIC BLOOD PRESSURE: 147 MMHG | OXYGEN SATURATION: 95 % | SYSTOLIC BLOOD PRESSURE: 154 MMHG | HEART RATE: 71 BPM | RESPIRATION RATE: 16 BRPM | RESPIRATION RATE: 22 BRPM | SYSTOLIC BLOOD PRESSURE: 138 MMHG | RESPIRATION RATE: 20 BRPM | WEIGHT: 182 LBS | HEART RATE: 70 BPM | DIASTOLIC BLOOD PRESSURE: 99 MMHG | HEART RATE: 69 BPM | SYSTOLIC BLOOD PRESSURE: 122 MMHG | DIASTOLIC BLOOD PRESSURE: 81 MMHG | RESPIRATION RATE: 16 BRPM | DIASTOLIC BLOOD PRESSURE: 82 MMHG | RESPIRATION RATE: 20 BRPM | DIASTOLIC BLOOD PRESSURE: 99 MMHG | SYSTOLIC BLOOD PRESSURE: 134 MMHG | HEART RATE: 69 BPM | SYSTOLIC BLOOD PRESSURE: 138 MMHG | DIASTOLIC BLOOD PRESSURE: 81 MMHG | OXYGEN SATURATION: 97 % | RESPIRATION RATE: 22 BRPM | DIASTOLIC BLOOD PRESSURE: 82 MMHG | DIASTOLIC BLOOD PRESSURE: 70 MMHG | DIASTOLIC BLOOD PRESSURE: 70 MMHG | HEART RATE: 68 BPM | HEIGHT: 71 IN | RESPIRATION RATE: 20 BRPM | SYSTOLIC BLOOD PRESSURE: 134 MMHG | OXYGEN SATURATION: 97 % | SYSTOLIC BLOOD PRESSURE: 154 MMHG | DIASTOLIC BLOOD PRESSURE: 91 MMHG | DIASTOLIC BLOOD PRESSURE: 70 MMHG | HEIGHT: 71 IN | SYSTOLIC BLOOD PRESSURE: 147 MMHG | TEMPERATURE: 96.9 F | OXYGEN SATURATION: 95 % | RESPIRATION RATE: 16 BRPM | RESPIRATION RATE: 19 BRPM | DIASTOLIC BLOOD PRESSURE: 81 MMHG | DIASTOLIC BLOOD PRESSURE: 82 MMHG | SYSTOLIC BLOOD PRESSURE: 122 MMHG | HEART RATE: 68 BPM | SYSTOLIC BLOOD PRESSURE: 147 MMHG | TEMPERATURE: 97.6 F | SYSTOLIC BLOOD PRESSURE: 134 MMHG | HEART RATE: 69 BPM | TEMPERATURE: 96.9 F | TEMPERATURE: 97.6 F | OXYGEN SATURATION: 97 % | SYSTOLIC BLOOD PRESSURE: 154 MMHG | OXYGEN SATURATION: 95 % | OXYGEN SATURATION: 96 % | DIASTOLIC BLOOD PRESSURE: 99 MMHG | HEART RATE: 71 BPM | WEIGHT: 182 LBS | TEMPERATURE: 97.6 F | RESPIRATION RATE: 19 BRPM

## 2020-10-01 DIAGNOSIS — D12.7 BENIGN NEOPLASM OF RECTOSIGMOID JUNCTION: ICD-10-CM

## 2020-10-01 DIAGNOSIS — K29.50 UNSPECIFIED CHRONIC GASTRITIS WITHOUT BLEEDING: ICD-10-CM

## 2020-10-01 DIAGNOSIS — R19.7 DIARRHEA, UNSPECIFIED: ICD-10-CM

## 2020-10-01 DIAGNOSIS — R11.10 VOMITING, UNSPECIFIED: ICD-10-CM

## 2020-10-01 PROBLEM — K31.89 OTHER DISEASES OF STOMACH AND DUODENUM: Status: ACTIVE | Noted: 2020-10-01

## 2020-10-01 PROBLEM — K63.5 POLYP OF COLON: Status: ACTIVE | Noted: 2020-10-01

## 2020-10-01 PROCEDURE — 45331 SIGMOIDOSCOPY AND BIOPSY: CPT | Mod: 51 | Performed by: INTERNAL MEDICINE

## 2020-10-01 PROCEDURE — 88305 TISSUE EXAM BY PATHOLOGIST: CPT | Performed by: INTERNAL MEDICINE

## 2020-10-01 PROCEDURE — 43239 EGD BIOPSY SINGLE/MULTIPLE: CPT | Performed by: INTERNAL MEDICINE

## 2020-10-01 PROCEDURE — 45331 SIGMOIDOSCOPY AND BIOPSY: CPT | Performed by: INTERNAL MEDICINE

## 2020-10-01 PROCEDURE — 88342 IMHCHEM/IMCYTCHM 1ST ANTB: CPT | Performed by: INTERNAL MEDICINE

## 2020-10-01 PROCEDURE — 88313 SPECIAL STAINS GROUP 2: CPT | Performed by: INTERNAL MEDICINE

## 2020-12-03 ENCOUNTER — OFFICE (OUTPATIENT)
Dept: URBAN - METROPOLITAN AREA CLINIC 11 | Facility: CLINIC | Age: 70
End: 2020-12-03

## 2020-12-03 VITALS
HEART RATE: 84 BPM | HEIGHT: 71 IN | WEIGHT: 177 LBS | OXYGEN SATURATION: 98 % | DIASTOLIC BLOOD PRESSURE: 55 MMHG | SYSTOLIC BLOOD PRESSURE: 92 MMHG

## 2020-12-03 DIAGNOSIS — G90.9 DISORDER OF THE AUTONOMIC NERVOUS SYSTEM, UNSPECIFIED: ICD-10-CM

## 2020-12-03 DIAGNOSIS — K58.9 IRRITABLE BOWEL SYNDROME WITHOUT DIARRHEA: ICD-10-CM

## 2020-12-03 DIAGNOSIS — R89.2: ICD-10-CM

## 2020-12-03 DIAGNOSIS — R11.10 VOMITING, UNSPECIFIED: ICD-10-CM

## 2020-12-03 PROCEDURE — 99213 OFFICE O/P EST LOW 20 MIN: CPT | Performed by: INTERNAL MEDICINE

## 2020-12-03 RX ORDER — HYOSCYAMINE SULFATE 0.12 MG/1
TABLET ORAL; SUBLINGUAL
Qty: 30 | Refills: 6 | Status: ACTIVE
Start: 2020-06-26

## 2020-12-03 RX ORDER — PROMETHAZINE HYDROCHLORIDE 25 MG/1
75 TABLET ORAL
Qty: 30 | Refills: 1 | Status: COMPLETED
Start: 2020-12-03 | End: 2022-01-01

## 2020-12-03 NOTE — SERVICENOTES
He has had a recent cycle of his diarrhea but this time he had more n/v.  His sx have resolved again with his meds.  We can continue them and his f/u for autonomia.  We discussed the porphyrin studies and eval with Dr. Meeks.

## 2020-12-03 NOTE — SERVICEHPINOTES
He sated taht over the past weekend he had diarrhea for about a day and a half ending on Monday (started Sunday). He did take lomotil which helped. This was followed by n/v yesterday.  He stated that "today is fine" but he has had some heartburn today after vomitig yesterday.  Phenergan heped with the nausea.  He has not had fevers or chills or abdomina pain.  This has made him feel "worn out" but "not bad".  He has had cycles of these sort of symptoms intermittently. The n/v issue is not frequent but the alternating diarrhea/constipation occur frequently.   He was not certain of a particular trigger. He stated that until this last week he had been doing fairly well  over the past month and a half. Most of his issues trend more to the constipation which had improved on Motegrity.  He has typically had good control of his GERD on his PPIs.  His rectal spasms have been controlled with Levsin and the topical creams. He has had a few more spasms of late.  He did have a negative covid test on Tuesday.He had a benign EGD and FSC in October with a small sessile serrated adenoma removed. He has not had his f/u with heme/onc for the elevated porphyrins yet.

## 2021-02-05 ENCOUNTER — OFFICE (OUTPATIENT)
Dept: URBAN - METROPOLITAN AREA CLINIC 11 | Facility: CLINIC | Age: 71
End: 2021-02-05

## 2021-02-05 VITALS
SYSTOLIC BLOOD PRESSURE: 165 MMHG | HEIGHT: 71 IN | OXYGEN SATURATION: 98 % | DIASTOLIC BLOOD PRESSURE: 94 MMHG | WEIGHT: 178 LBS | HEART RATE: 80 BPM

## 2021-02-05 DIAGNOSIS — K59.00 CONSTIPATION, UNSPECIFIED: ICD-10-CM

## 2021-02-05 DIAGNOSIS — K21.9 GASTRO-ESOPHAGEAL REFLUX DISEASE WITHOUT ESOPHAGITIS: ICD-10-CM

## 2021-02-05 PROCEDURE — 99213 OFFICE O/P EST LOW 20 MIN: CPT | Performed by: INTERNAL MEDICINE

## 2021-02-05 RX ORDER — PRUCALOPRIDE 2 MG/1
2 TABLET, FILM COATED ORAL
Qty: 90 | Refills: 3 | Status: ACTIVE

## 2021-02-05 RX ORDER — PANTOPRAZOLE SODIUM 40 MG/1
40 TABLET, DELAYED RELEASE ORAL
Qty: 90 | Refills: 3 | Status: ACTIVE

## 2021-02-05 NOTE — SERVICEHPINOTES
He stated that the past week had a half has been "good".  He has had better stooling and less difficulty passing the stools.  He thought that the improvement was due to the Motegrity.  He has still had some diarrhea after passing the harder stool but the diarrhea has decreased.He has been on his Protonix with good control of his GERD. He stated "Its doing real good".  He has not had the recurrent regurgitation as in the past and has only had two episodes which results in vomiting. He has done much better over the past three weeks.

## 2021-02-05 NOTE — SERVICENOTES
He has been actually doing quite a bit better on his meds and I suspect that the improvement is due to the consistent use of the Motegrity (upper and lower motility activity).

## 2021-03-23 ENCOUNTER — OFFICE (OUTPATIENT)
Dept: URBAN - METROPOLITAN AREA CLINIC 11 | Facility: CLINIC | Age: 71
End: 2021-03-23

## 2021-03-23 VITALS
SYSTOLIC BLOOD PRESSURE: 158 MMHG | OXYGEN SATURATION: 96 % | WEIGHT: 188 LBS | DIASTOLIC BLOOD PRESSURE: 99 MMHG | HEART RATE: 83 BPM | HEIGHT: 71 IN

## 2021-03-23 DIAGNOSIS — R10.30 LOWER ABDOMINAL PAIN, UNSPECIFIED: ICD-10-CM

## 2021-03-23 DIAGNOSIS — G90.9 DISORDER OF THE AUTONOMIC NERVOUS SYSTEM, UNSPECIFIED: ICD-10-CM

## 2021-03-23 DIAGNOSIS — R19.7 DIARRHEA, UNSPECIFIED: ICD-10-CM

## 2021-03-23 LAB
CBC, PLATELET, NO DIFFERENTIAL: HEMATOCRIT: 42.4 % (ref 37.5–51)
CBC, PLATELET, NO DIFFERENTIAL: HEMOGLOBIN: 14.3 G/DL (ref 13–17.7)
CBC, PLATELET, NO DIFFERENTIAL: MCH: 33.3 PG — HIGH (ref 26.6–33)
CBC, PLATELET, NO DIFFERENTIAL: MCHC: 33.7 G/DL (ref 31.5–35.7)
CBC, PLATELET, NO DIFFERENTIAL: MCV: 99 FL — HIGH (ref 79–97)
CBC, PLATELET, NO DIFFERENTIAL: PLATELETS: 243 X10E3/UL (ref 150–450)
CBC, PLATELET, NO DIFFERENTIAL: RBC: 4.3 X10E6/UL (ref 4.14–5.8)
CBC, PLATELET, NO DIFFERENTIAL: RDW: 13.4 % (ref 11.6–15.4)
CBC, PLATELET, NO DIFFERENTIAL: WBC: 11.5 X10E3/UL — HIGH (ref 3.4–10.8)
COMP. METABOLIC PANEL (14): A/G RATIO: 1.7 (ref 1.2–2.2)
COMP. METABOLIC PANEL (14): ALBUMIN: 4.4 G/DL (ref 3.7–4.7)
COMP. METABOLIC PANEL (14): ALKALINE PHOSPHATASE: 106 IU/L (ref 39–117)
COMP. METABOLIC PANEL (14): ALT (SGPT): 21 IU/L (ref 0–44)
COMP. METABOLIC PANEL (14): AST (SGOT): 26 IU/L (ref 0–40)
COMP. METABOLIC PANEL (14): BILIRUBIN, TOTAL: 0.4 MG/DL (ref 0–1.2)
COMP. METABOLIC PANEL (14): BUN/CREATININE RATIO: 13 (ref 10–24)
COMP. METABOLIC PANEL (14): BUN: 13 MG/DL (ref 8–27)
COMP. METABOLIC PANEL (14): CALCIUM: 9.2 MG/DL (ref 8.6–10.2)
COMP. METABOLIC PANEL (14): CARBON DIOXIDE, TOTAL: 27 MMOL/L (ref 20–29)
COMP. METABOLIC PANEL (14): CHLORIDE: 97 MMOL/L (ref 96–106)
COMP. METABOLIC PANEL (14): CREATININE: 1.01 MG/DL (ref 0.76–1.27)
COMP. METABOLIC PANEL (14): EGFR IF AFRICN AM: 86 ML/MIN/1.73 (ref 59–?)
COMP. METABOLIC PANEL (14): EGFR IF NONAFRICN AM: 74 ML/MIN/1.73 (ref 59–?)
COMP. METABOLIC PANEL (14): GLOBULIN, TOTAL: 2.6 G/DL (ref 1.5–4.5)
COMP. METABOLIC PANEL (14): GLUCOSE: 94 MG/DL (ref 65–99)
COMP. METABOLIC PANEL (14): POTASSIUM: 4.4 MMOL/L (ref 3.5–5.2)
COMP. METABOLIC PANEL (14): PROTEIN, TOTAL: 7 G/DL (ref 6–8.5)
COMP. METABOLIC PANEL (14): SODIUM: 136 MMOL/L (ref 134–144)
MICROSCOPIC EXAMINATION: BACTERIA: (no result)
MICROSCOPIC EXAMINATION: CAST TYPE: (no result)
MICROSCOPIC EXAMINATION: CASTS: (no result)
MICROSCOPIC EXAMINATION: COMMENT: (no result)
MICROSCOPIC EXAMINATION: CRYSTAL TYPE: (no result)
MICROSCOPIC EXAMINATION: CRYSTALS: (no result)
MICROSCOPIC EXAMINATION: EPITHELIAL CELLS (NON RENAL): (no result) /HPF
MICROSCOPIC EXAMINATION: EPITHELIAL CELLS (RENAL): (no result)
MICROSCOPIC EXAMINATION: MUCUS THREADS: PRESENT
MICROSCOPIC EXAMINATION: RBC: (no result) /HPF
MICROSCOPIC EXAMINATION: TRICHOMONAS: (no result)
MICROSCOPIC EXAMINATION: WBC: (no result) /HPF
MICROSCOPIC EXAMINATION: YEAST: (no result)
URINALYSIS, COMPLETE: APPEARANCE: CLEAR
URINALYSIS, COMPLETE: BILIRUBIN: NEGATIVE
URINALYSIS, COMPLETE: GLUCOSE: NEGATIVE
URINALYSIS, COMPLETE: KETONES: NEGATIVE
URINALYSIS, COMPLETE: MICROSCOPIC EXAMINATION: (no result)
URINALYSIS, COMPLETE: NITRITE, URINE: NEGATIVE
URINALYSIS, COMPLETE: OCCULT BLOOD: ABNORMAL
URINALYSIS, COMPLETE: PH: 6 (ref 5–7.5)
URINALYSIS, COMPLETE: PROTEIN: NEGATIVE
URINALYSIS, COMPLETE: SPECIFIC GRAVITY: 1.01 (ref 1–1.03)
URINALYSIS, COMPLETE: URINE-COLOR: YELLOW
URINALYSIS, COMPLETE: UROBILINOGEN,SEMI-QN: 0.2 MG/DL (ref 0.2–1)
URINALYSIS, COMPLETE: WBC ESTERASE: ABNORMAL

## 2021-03-23 PROCEDURE — 99214 OFFICE O/P EST MOD 30 MIN: CPT | Performed by: NURSE PRACTITIONER

## 2021-03-23 NOTE — SERVICEHPINOTES
Mr. Maier is a 70 year old male that presents today for follow up. The most recent cycle started a week before last week. He notes the frequency of these cycles vary. Symptoms start with gas and constipation, and this lasts for 2-5 days minimum. After this he develops diarrhea, nausea, and vomiting. This last for a couple of days. No specific aggravating factors. He notes that he takes GI cocktail by mouth for vomiting with mild improvement.He notes that he is having severe abdominal pain in the lower abdomen. He notes that the pain is much worse than it had been in quite some time since his colon resection. He denies presence of hematochezia and melena. Pain is constant. Pain is characterized as cramping. No specific aggravating factors. Pain will stop when the cycle stops. Severity of pain is severe.He denies increase in urinary frequency, dysuria, or oliguria. He denies hematuria or change in urine color. BR

## 2021-03-23 NOTE — SERVICENOTES
Will get CT d/t worsening of abdominal pain and decide further plan based on findings. Since pain is lower will get UA to r/o urinary involvement.

Attending  Case was d/w Phyllis ENRIQUEZ while the pt was in clinic.  I agree with the plan of care and evaluation.

## 2021-04-20 ENCOUNTER — OFFICE (OUTPATIENT)
Dept: URBAN - METROPOLITAN AREA CLINIC 11 | Facility: CLINIC | Age: 71
End: 2021-04-20

## 2021-04-20 VITALS
WEIGHT: 178 LBS | OXYGEN SATURATION: 99 % | HEART RATE: 75 BPM | SYSTOLIC BLOOD PRESSURE: 155 MMHG | HEIGHT: 71 IN | DIASTOLIC BLOOD PRESSURE: 96 MMHG

## 2021-04-20 DIAGNOSIS — K21.9 GASTRO-ESOPHAGEAL REFLUX DISEASE WITHOUT ESOPHAGITIS: ICD-10-CM

## 2021-04-20 DIAGNOSIS — G90.9 DISORDER OF THE AUTONOMIC NERVOUS SYSTEM, UNSPECIFIED: ICD-10-CM

## 2021-04-20 DIAGNOSIS — K59.00 CONSTIPATION, UNSPECIFIED: ICD-10-CM

## 2021-04-20 DIAGNOSIS — K64.9 UNSPECIFIED HEMORRHOIDS: ICD-10-CM

## 2021-04-20 PROCEDURE — 99213 OFFICE O/P EST LOW 20 MIN: CPT | Performed by: NURSE PRACTITIONER

## 2021-04-20 NOTE — SERVICENOTES
Since he is doing well with his current regimen at this time we will continue what we are doing and follow up in 4 months. We discussed his CT results and recommendations to follow up with Dr. Blaek and Dr. Rodney.

Attending.  Pt was seen and evaluationed.  Case was d/w Phyllis ENRIQUEZ and I agree with the above evlaution. He has been doing quite well and stated that he was doing well.  He thought that this might be in part due to the Motegrity.  His stooling has improved.  He has not had diarrhea or vomtiing in a month. We discussed his CT results (follow up with Dr. Blake for the nodular changes-which may be post infectious)(urology f/u with the dierticulum), meds, and hemorrhoidal meds.

## 2021-04-20 NOTE — SERVICEHPINOTES
Mr. Maier is a 70 year old male that presents today for follow up. He notes he is doing well since he was seen at the last visit. He notes that he still has constipation, and he hasn't had diarrhea in a couple of weeks. He notes he is having bowel movements "better" than he was. He notes that he still has intermittent abdominal pain, but it has improved as it is not as often. He denies having any further nausea. FONT style="BACKGROUND-COLOR: #ffffcc" visited="true"BRFONT style="BACKGROUND-COLOR: #dddddd" visited="true"BRCT abdomen/pelvis results discussed with patient and wifeRUFINO/MARLENE/KENNEDY

## 2021-08-17 ENCOUNTER — OFFICE (OUTPATIENT)
Dept: URBAN - METROPOLITAN AREA CLINIC 11 | Facility: CLINIC | Age: 71
End: 2021-08-17
Payer: COMMERCIAL

## 2021-08-17 VITALS
WEIGHT: 186 LBS | HEIGHT: 71 IN | SYSTOLIC BLOOD PRESSURE: 152 MMHG | OXYGEN SATURATION: 99 % | DIASTOLIC BLOOD PRESSURE: 99 MMHG | HEART RATE: 70 BPM

## 2021-08-17 DIAGNOSIS — R13.19 OTHER DYSPHAGIA: ICD-10-CM

## 2021-08-17 DIAGNOSIS — K59.00 CONSTIPATION, UNSPECIFIED: ICD-10-CM

## 2021-08-17 PROCEDURE — 99214 OFFICE O/P EST MOD 30 MIN: CPT | Performed by: INTERNAL MEDICINE

## 2021-08-17 RX ORDER — PROMETHAZINE HYDROCHLORIDE 25 MG/1
75 TABLET ORAL
Qty: 30 | Refills: 3 | Status: COMPLETED
Start: 2021-08-17 | End: 2022-01-01

## 2021-08-17 RX ORDER — HYOSCYAMINE SULFATE 0.12 MG/1
TABLET ORAL; SUBLINGUAL
Qty: 30 | Refills: 6 | Status: ACTIVE
Start: 2020-06-26

## 2021-08-17 RX ORDER — DOCUSATE SODIUM -SENNOSIDES 50; 8.6 MG/1; MG/1
8.6 TABLET, COATED ORAL
Qty: 30 | Refills: 3 | Status: COMPLETED
Start: 2021-08-17 | End: 2022-01-01

## 2021-08-17 NOTE — SERVICEHPINOTES
"Not too bad."  He thought that his diarrhea is "pretty much gone." He will have normal stools for 3-4 days and then skip a week without a stool followed by lots of gas for a couple of days followed by a normal to hard stool. He is on Motegrity which has worked much better that the other meds. He is switching off of the morphine to another opiate.  He has not had recent issues with nausea or vomiting.  He has had some issues with dysphagia to chicken with a sensation that it is sticking in his "throat".  He will have to "hack it back up" or eventually it will go down.  He has been on his PPIs and has not had reflux issues.  He had a benign EGD in 2020.

## 2021-08-17 NOTE — SERVICENOTES
He has improved but still has issues with the constipation and fortunately the diarrhea has stopped.  I would like to add Senna as needed to the Motegrity. He has had good control of his GERD on the PPIs. With his dysphagia, I would do the esophagram and possibly MBS if needed. He had a benign EGD last year.

## 2021-11-19 ENCOUNTER — OFFICE (OUTPATIENT)
Dept: URBAN - METROPOLITAN AREA CLINIC 11 | Facility: CLINIC | Age: 71
End: 2021-11-19
Payer: COMMERCIAL

## 2021-11-19 VITALS
DIASTOLIC BLOOD PRESSURE: 94 MMHG | HEART RATE: 71 BPM | WEIGHT: 190 LBS | OXYGEN SATURATION: 95 % | SYSTOLIC BLOOD PRESSURE: 142 MMHG | HEIGHT: 71 IN

## 2021-11-19 DIAGNOSIS — K59.00 CONSTIPATION, UNSPECIFIED: ICD-10-CM

## 2021-11-19 DIAGNOSIS — G90.9 DISORDER OF THE AUTONOMIC NERVOUS SYSTEM, UNSPECIFIED: ICD-10-CM

## 2021-11-19 DIAGNOSIS — R13.10 DYSPHAGIA, UNSPECIFIED: ICD-10-CM

## 2021-11-19 PROCEDURE — 99214 OFFICE O/P EST MOD 30 MIN: CPT | Performed by: NURSE PRACTITIONER

## 2021-11-19 NOTE — SERVICENOTES
He has some issues with his constipation since he missed some doses of Motegrity due to insurance issues. We can try some Miralax for a few days to jump start him and get him back to a regular stool pattern. He has had good control of his GERD on the PPIs. With his dysphagia and negative Esophagram and MBS we talked about swallowing safety: eating small bites, chewing thoroughly and tucking his chin when he swallows.

## 2021-11-19 NOTE — SERVICEHPINOTES
He notes "I'm about the same." He notes that he has a bowel movement once every week to two weeks. Stools are formed and normal to hard.  He continues to take Motegrity, but he notes that it is not working quite as well as it used to before we were having trouble getting the medication covered by insurance.
victorino gleason He has intermittent rectal spasms that are relieved with use of diltiazem cream and hyoscyamine. victorino Rao notes that he continues to have some issues with dysphagia with no particular foods. It is not consistent. He thinks it occurs most often when he is not concentrating on eating. He notes that he will sometimes have to cough and get it back up, and other times it will eventually go down. He is currently taking PPIs with no reflux issues. He had a benign EGD in 2020. Most recent esophagram and MBS in September of this year were WNL.victorino gleason

## 2022-01-01 ENCOUNTER — OFFICE (OUTPATIENT)
Dept: URBAN - METROPOLITAN AREA CLINIC 11 | Facility: CLINIC | Age: 72
End: 2022-01-01

## 2022-01-01 ENCOUNTER — AMBULATORY SURGICAL CENTER (OUTPATIENT)
Dept: URBAN - METROPOLITAN AREA SURGERY 3 | Facility: SURGERY | Age: 72
End: 2022-01-01
Payer: COMMERCIAL

## 2022-01-01 ENCOUNTER — OFFICE (OUTPATIENT)
Dept: URBAN - METROPOLITAN AREA CLINIC 19 | Facility: CLINIC | Age: 72
End: 2022-01-01

## 2022-01-01 ENCOUNTER — OFFICE (OUTPATIENT)
Dept: URBAN - METROPOLITAN AREA PATHOLOGY 22 | Facility: PATHOLOGY | Age: 72
End: 2022-01-01

## 2022-01-01 ENCOUNTER — AMBULATORY SURGICAL CENTER (OUTPATIENT)
Dept: URBAN - METROPOLITAN AREA SURGERY 3 | Facility: SURGERY | Age: 72
End: 2022-01-01

## 2022-01-01 VITALS
HEART RATE: 107 BPM | TEMPERATURE: 98.5 F | DIASTOLIC BLOOD PRESSURE: 75 MMHG | HEART RATE: 103 BPM | SYSTOLIC BLOOD PRESSURE: 152 MMHG | HEART RATE: 103 BPM | SYSTOLIC BLOOD PRESSURE: 110 MMHG | SYSTOLIC BLOOD PRESSURE: 132 MMHG | HEIGHT: 71 IN | HEART RATE: 99 BPM | OXYGEN SATURATION: 96 % | WEIGHT: 197 LBS | SYSTOLIC BLOOD PRESSURE: 123 MMHG | TEMPERATURE: 97.1 F | HEART RATE: 107 BPM | RESPIRATION RATE: 18 BRPM | DIASTOLIC BLOOD PRESSURE: 83 MMHG | TEMPERATURE: 97.1 F | RESPIRATION RATE: 16 BRPM | DIASTOLIC BLOOD PRESSURE: 77 MMHG | TEMPERATURE: 98.5 F | HEART RATE: 99 BPM | HEART RATE: 103 BPM | HEART RATE: 104 BPM | OXYGEN SATURATION: 93 % | TEMPERATURE: 98.5 F | OXYGEN SATURATION: 93 % | SYSTOLIC BLOOD PRESSURE: 152 MMHG | OXYGEN SATURATION: 93 % | HEIGHT: 71 IN | HEART RATE: 99 BPM | RESPIRATION RATE: 14 BRPM | RESPIRATION RATE: 18 BRPM | DIASTOLIC BLOOD PRESSURE: 77 MMHG | DIASTOLIC BLOOD PRESSURE: 75 MMHG | DIASTOLIC BLOOD PRESSURE: 83 MMHG | DIASTOLIC BLOOD PRESSURE: 75 MMHG | RESPIRATION RATE: 16 BRPM | SYSTOLIC BLOOD PRESSURE: 110 MMHG | SYSTOLIC BLOOD PRESSURE: 133 MMHG | OXYGEN SATURATION: 95 % | HEIGHT: 71 IN | HEART RATE: 107 BPM | OXYGEN SATURATION: 95 % | OXYGEN SATURATION: 95 % | SYSTOLIC BLOOD PRESSURE: 132 MMHG | WEIGHT: 197 LBS | RESPIRATION RATE: 18 BRPM | DIASTOLIC BLOOD PRESSURE: 99 MMHG | OXYGEN SATURATION: 96 % | HEART RATE: 104 BPM | HEART RATE: 105 BPM | HEART RATE: 105 BPM | SYSTOLIC BLOOD PRESSURE: 133 MMHG | RESPIRATION RATE: 16 BRPM | DIASTOLIC BLOOD PRESSURE: 77 MMHG | SYSTOLIC BLOOD PRESSURE: 133 MMHG | SYSTOLIC BLOOD PRESSURE: 123 MMHG | RESPIRATION RATE: 14 BRPM | SYSTOLIC BLOOD PRESSURE: 123 MMHG | SYSTOLIC BLOOD PRESSURE: 132 MMHG | DIASTOLIC BLOOD PRESSURE: 99 MMHG | DIASTOLIC BLOOD PRESSURE: 99 MMHG | WEIGHT: 197 LBS | DIASTOLIC BLOOD PRESSURE: 83 MMHG | RESPIRATION RATE: 14 BRPM | SYSTOLIC BLOOD PRESSURE: 152 MMHG | HEART RATE: 105 BPM | HEART RATE: 104 BPM | OXYGEN SATURATION: 96 % | SYSTOLIC BLOOD PRESSURE: 110 MMHG | TEMPERATURE: 97.1 F

## 2022-01-01 VITALS
HEIGHT: 71 IN | DIASTOLIC BLOOD PRESSURE: 80 MMHG | WEIGHT: 205 LBS | OXYGEN SATURATION: 98 % | SYSTOLIC BLOOD PRESSURE: 150 MMHG | HEART RATE: 72 BPM

## 2022-01-01 VITALS
DIASTOLIC BLOOD PRESSURE: 92 MMHG | HEART RATE: 90 BPM | OXYGEN SATURATION: 98 % | SYSTOLIC BLOOD PRESSURE: 146 MMHG | WEIGHT: 183 LBS | HEIGHT: 71 IN

## 2022-01-01 VITALS
WEIGHT: 204 LBS | HEART RATE: 63 BPM | OXYGEN SATURATION: 98 % | HEIGHT: 71 IN | SYSTOLIC BLOOD PRESSURE: 129 MMHG | DIASTOLIC BLOOD PRESSURE: 80 MMHG

## 2022-01-01 DIAGNOSIS — Z86.010 PERSONAL HISTORY OF COLONIC POLYPS: ICD-10-CM

## 2022-01-01 DIAGNOSIS — K63.5 POLYP OF COLON: ICD-10-CM

## 2022-01-01 DIAGNOSIS — K21.9 GASTRO-ESOPHAGEAL REFLUX DISEASE WITHOUT ESOPHAGITIS: ICD-10-CM

## 2022-01-01 DIAGNOSIS — D12.2 BENIGN NEOPLASM OF ASCENDING COLON: ICD-10-CM

## 2022-01-01 DIAGNOSIS — K59.00 CONSTIPATION, UNSPECIFIED: ICD-10-CM

## 2022-01-01 DIAGNOSIS — T50.905A ADVERSE EFFECT OF UNSPECIFIED DRUGS, MEDICAMENTS AND BIOLOGI: ICD-10-CM

## 2022-01-01 DIAGNOSIS — R60.9 EDEMA, UNSPECIFIED: ICD-10-CM

## 2022-01-01 LAB
CBC, PLATELET, NO DIFFERENTIAL: HEMATOCRIT: (no result)
CBC, PLATELET, NO DIFFERENTIAL: HEMOGLOBIN: (no result)
CBC, PLATELET, NO DIFFERENTIAL: MCH: (no result)
CBC, PLATELET, NO DIFFERENTIAL: MCHC: (no result)
CBC, PLATELET, NO DIFFERENTIAL: MCV: (no result)
CBC, PLATELET, NO DIFFERENTIAL: NRBC: (no result) %
CBC, PLATELET, NO DIFFERENTIAL: PLATELETS: (no result)
CBC, PLATELET, NO DIFFERENTIAL: RBC: (no result)
CBC, PLATELET, NO DIFFERENTIAL: RDW: (no result)
CBC, PLATELET, NO DIFFERENTIAL: WBC: (no result) X10E3/UL
COMP. METABOLIC PANEL (14): A/G RATIO: 1.8 (ref 1.2–2.2)
COMP. METABOLIC PANEL (14): ALBUMIN: 4.4 G/DL (ref 3.7–4.7)
COMP. METABOLIC PANEL (14): ALKALINE PHOSPHATASE: 107 IU/L (ref 44–121)
COMP. METABOLIC PANEL (14): ALT (SGPT): 41 IU/L (ref 0–44)
COMP. METABOLIC PANEL (14): AST (SGOT): 60 IU/L — HIGH (ref 0–40)
COMP. METABOLIC PANEL (14): BILIRUBIN, TOTAL: 0.5 MG/DL (ref 0–1.2)
COMP. METABOLIC PANEL (14): BUN/CREATININE RATIO: 14 (ref 10–24)
COMP. METABOLIC PANEL (14): BUN: 13 MG/DL (ref 8–27)
COMP. METABOLIC PANEL (14): CALCIUM: 9.1 MG/DL (ref 8.6–10.2)
COMP. METABOLIC PANEL (14): CARBON DIOXIDE, TOTAL: 22 MMOL/L (ref 20–29)
COMP. METABOLIC PANEL (14): CHLORIDE: 100 MMOL/L (ref 96–106)
COMP. METABOLIC PANEL (14): CREATININE: 0.95 MG/DL (ref 0.76–1.27)
COMP. METABOLIC PANEL (14): EGFR: 85 ML/MIN/1.73 (ref 59–?)
COMP. METABOLIC PANEL (14): GLOBULIN, TOTAL: 2.4 G/DL (ref 1.5–4.5)
COMP. METABOLIC PANEL (14): GLUCOSE: 87 MG/DL (ref 70–99)
COMP. METABOLIC PANEL (14): POTASSIUM: 4.6 MMOL/L (ref 3.5–5.2)
COMP. METABOLIC PANEL (14): PROTEIN, TOTAL: 6.8 G/DL (ref 6–8.5)
COMP. METABOLIC PANEL (14): SODIUM: 142 MMOL/L (ref 134–144)
REQUEST PROBLEM: (no result)

## 2022-01-01 PROCEDURE — 45380 COLONOSCOPY AND BIOPSY: CPT | Mod: 59 | Performed by: INTERNAL MEDICINE

## 2022-01-01 PROCEDURE — 45385 COLONOSCOPY W/LESION REMOVAL: CPT | Performed by: INTERNAL MEDICINE

## 2022-01-01 PROCEDURE — 76700 US EXAM ABDOM COMPLETE: CPT | Mod: TC | Performed by: INTERNAL MEDICINE

## 2022-01-01 PROCEDURE — 99214 OFFICE O/P EST MOD 30 MIN: CPT | Performed by: INTERNAL MEDICINE

## 2022-01-01 PROCEDURE — 99213 OFFICE O/P EST LOW 20 MIN: CPT | Performed by: INTERNAL MEDICINE

## 2022-01-01 RX ORDER — PRUCALOPRIDE 2 MG/1
2 TABLET, FILM COATED ORAL
Qty: 90 | Refills: 3 | Status: ACTIVE

## 2022-01-01 RX ORDER — PANTOPRAZOLE SODIUM 40 MG/1
40 TABLET, DELAYED RELEASE ORAL
Qty: 90 | Refills: 3 | Status: ACTIVE

## 2022-01-01 RX ORDER — HYOSCYAMINE SULFATE 0.12 MG/1
TABLET ORAL; SUBLINGUAL
Qty: 30 | Refills: 6 | Status: ACTIVE
Start: 2020-06-26

## 2022-01-01 RX ORDER — POLYETHYLENE GLYCOL 3350 17 G/17G
POWDER, FOR SOLUTION ORAL
Qty: 756 | Refills: 11 | Status: ACTIVE
Start: 2022-01-01

## 2022-02-21 NOTE — SERVICEHPINOTES
He presents for f/u of his chronic constipation and issues with gas and cramping. He has been having quite a bit of variability in his stooling and recently may skip 3-4 days.  Sometimes he will have stools daily.  He has been having abdominal cramping which did increase last week though he had stools daily for about 4 days.  He is still on the Motegrity.  He has used Miralax intermittently.  He has not been on the Miralax consistently.  He has had "a lot of gas" with "very loud flatulence". 
br
victorino He has not had issues with reflux on his meds.  He has also not had recent issues with dysphagia.   br
victorino   He has not had n/v or diarrhea as he had in the past.

## 2022-02-21 NOTE — SERVICENOTES
He has had good control of his GERD on his meds and with his diet control.  His diarrhea/constipation has been more of a constipation issue for a while.  We discussed use of Motegrity, daily MIralax, and possible pelvic floor/sigmoidocele/etc... type issue and referral to Dr. Diana for testing.

## 2022-07-01 NOTE — SERVICENOTES
He is currently doing better but I suspect that this is limited.  We discussed a change to Zelnorm if his sx do not continue to be well controlled.

## 2022-07-01 NOTE — SERVICEHPINOTES
Pt presents for f/u of his constipation issues.  He has been having normal stools over the past month or so without the diarrhea.  He has still had some pain in the lower abdomen prior to passing stools due to the large sizes of the stools. He has had some spotting at times with the passage of the larger stools.  He stated that this past week and a half he had had normal stools. He was not certain of what helped with the improvements.  He has been on Miralax most days but has skipped doses.  He was not sure that it had changed much though. He is on Motegrity consistently. 
br
br He was seen by Dr. Vernon and was to have a Sitz marker study. We did not have the results today and it was not clear that it was completed.

## 2022-10-03 NOTE — SERVICENOTES
He is due for his f/u colon with his hx of polyps.  I suspect that some of his discomforts are related to his IBS and dysautonomia with constipation.  I will add Colace to his Motegrity and continue the Levsin and add the u/s.  With his bilateral edema, I would have him f/u with Dr. Urrutia and also check labs.

## 2022-10-03 NOTE — SERVICEHPINOTES
He is still having difficulties with passing stools. he sated that he will pass a "huge turd" 2-3 times every 3 days or so.  They have been so large that the toilets will back up.  After the larger stools he has had some bleeding.  He has been on motegrity which helps and Miralax.  He is able to have stools which is an improvement.  He uses Miralax intermittently. He has not been on Colace. 
victorino gleason  
He has had issues with gas and cramping abdominal pain.  This can be sudden in onset and "last for a little bit and go away".  It may happen for a "few hours to a few days".  Walking helps with the stooling but not the cramping.  He has not noted a relationship to his stool patterns.  He stated that it is irregular.  He has it several times a week. He has not noted a food related issue and it is not associated with meals. The cramping is diffuse and lower in the abdomen. The Levsin helps sometimes. victorino gleason He had his last colonoscopy in 2017 with a small adenoma. He is due for his f/u colon this year.

## 2023-04-14 ENCOUNTER — OFFICE (OUTPATIENT)
Dept: URBAN - METROPOLITAN AREA CLINIC 11 | Facility: CLINIC | Age: 73
End: 2023-04-14